# Patient Record
Sex: MALE | Race: OTHER
[De-identification: names, ages, dates, MRNs, and addresses within clinical notes are randomized per-mention and may not be internally consistent; named-entity substitution may affect disease eponyms.]

---

## 2021-08-04 ENCOUNTER — FORM ENCOUNTER (OUTPATIENT)
Age: 21
End: 2021-08-04

## 2021-08-05 PROBLEM — Z00.00 ENCOUNTER FOR PREVENTIVE HEALTH EXAMINATION: Status: ACTIVE | Noted: 2021-08-05

## 2021-08-12 ENCOUNTER — APPOINTMENT (OUTPATIENT)
Dept: NEUROLOGY | Facility: CLINIC | Age: 21
End: 2021-08-12
Payer: COMMERCIAL

## 2021-08-12 PROCEDURE — 95816 EEG AWAKE AND DROWSY: CPT

## 2022-06-13 DIAGNOSIS — D58.2 OTHER HEMOGLOBINOPATHIES: ICD-10-CM

## 2022-06-13 DIAGNOSIS — Z23 ENCOUNTER FOR IMMUNIZATION: ICD-10-CM

## 2022-06-13 RX ORDER — CIPROFLOXACIN HYDROCHLORIDE 500 MG/1
500 TABLET, FILM COATED ORAL
Refills: 0 | Status: ACTIVE | COMMUNITY

## 2022-06-13 RX ORDER — FLUTICASONE PROPIONATE 50 MCG
50 SPRAY, SUSPENSION NASAL
Refills: 0 | Status: ACTIVE | COMMUNITY

## 2022-06-13 RX ORDER — ALBUTEROL SULFATE 90 UG/1
108 (90 BASE) AEROSOL, METERED RESPIRATORY (INHALATION)
Refills: 0 | Status: ACTIVE | COMMUNITY

## 2022-06-16 ENCOUNTER — APPOINTMENT (OUTPATIENT)
Dept: HEMATOLOGY ONCOLOGY | Facility: CLINIC | Age: 22
End: 2022-06-16

## 2022-07-25 ENCOUNTER — RESULT REVIEW (OUTPATIENT)
Age: 22
End: 2022-07-25

## 2022-07-25 ENCOUNTER — TRANSCRIPTION ENCOUNTER (OUTPATIENT)
Age: 22
End: 2022-07-25

## 2022-07-25 ENCOUNTER — APPOINTMENT (OUTPATIENT)
Dept: HEMATOLOGY ONCOLOGY | Facility: CLINIC | Age: 22
End: 2022-07-25

## 2022-07-25 ENCOUNTER — LABORATORY RESULT (OUTPATIENT)
Age: 22
End: 2022-07-25

## 2022-07-25 VITALS
HEIGHT: 73 IN | WEIGHT: 156 LBS | RESPIRATION RATE: 18 BRPM | HEART RATE: 88 BPM | TEMPERATURE: 98.7 F | OXYGEN SATURATION: 99 % | SYSTOLIC BLOOD PRESSURE: 129 MMHG | BODY MASS INDEX: 20.67 KG/M2 | DIASTOLIC BLOOD PRESSURE: 83 MMHG

## 2022-07-25 DIAGNOSIS — Z78.9 OTHER SPECIFIED HEALTH STATUS: ICD-10-CM

## 2022-07-25 DIAGNOSIS — Z87.09 PERSONAL HISTORY OF OTHER DISEASES OF THE RESPIRATORY SYSTEM: ICD-10-CM

## 2022-07-25 DIAGNOSIS — Z87.898 PERSONAL HISTORY OF OTHER SPECIFIED CONDITIONS: ICD-10-CM

## 2022-07-25 PROCEDURE — 99205 OFFICE O/P NEW HI 60 MIN: CPT

## 2022-07-25 NOTE — REVIEW OF SYSTEMS
[Negative] : Allergic/Immunologic [FreeTextEntry6] : Asthma [de-identified] : History of syncopal episode

## 2022-07-25 NOTE — ASSESSMENT
[FreeTextEntry1] : This appears to be a relatively acute and progressive finding.\par I will obtain a CBC with differential and a peripheral blood smear review.\par I will send for a JAK2 with reflex mutational analysis, CALR and MPL mutational analyses.\par I will obtain a complete hematological workup to include a CMP, LDH, haptoglobin, reticulocyte count, B12/MMA/folic acid levels, TSH, SPEP, SIEP, IgQ,serum free light chains, TIERRA,  RF, panel, ferritin level, and a Vikas' test.\par He has been asked to start baby aspirin until the results of the above mentioned work-up for a myeloproliferative process are available.\par The patient will return in 3 weeks for followup, review of the above workup, and further recommendations.\par \par Thank you very much for allowing me to participate in the care of this patient. Should you have any questions or concerns please do not hesitate to call me directly.

## 2022-07-25 NOTE — HISTORY OF PRESENT ILLNESS
[0 - No Distress] : Distress Level: 0 [de-identified] : Mr. Laboy is a 22-year-old male who is referred by Dr. Vivienne Hager for initial consultation for erythrocytosis.\par He was found on routine lab work on 4/15/2022 to have a hemoglobin of 18.2 hematocrit of 54.0.  The remainder of his lab work was unremarkable.  Repeat CBC done on May 5/19/2022 demonstrated a hemoglobin of 17.5 and hematocrit of 54.3 Iron profile done that day was normal.  He has no current complaints but his history reveals episodes of syncope in summer 2021.  EEG was normal.

## 2022-07-26 ENCOUNTER — NON-APPOINTMENT (OUTPATIENT)
Age: 22
End: 2022-07-26

## 2022-07-29 ENCOUNTER — APPOINTMENT (OUTPATIENT)
Dept: UROLOGY | Facility: CLINIC | Age: 22
End: 2022-07-29
Payer: COMMERCIAL

## 2022-07-29 VITALS
HEART RATE: 106 BPM | HEIGHT: 73 IN | SYSTOLIC BLOOD PRESSURE: 135 MMHG | DIASTOLIC BLOOD PRESSURE: 83 MMHG | BODY MASS INDEX: 20.67 KG/M2 | WEIGHT: 156 LBS

## 2022-07-29 PROCEDURE — 99203 OFFICE O/P NEW LOW 30 MIN: CPT

## 2022-07-29 RX ORDER — CIPROFLOXACIN HYDROCHLORIDE 500 MG/1
500 TABLET, FILM COATED ORAL
Qty: 10 | Refills: 0 | Status: ACTIVE | COMMUNITY
Start: 2022-07-29 | End: 1900-01-01

## 2022-07-29 NOTE — HISTORY OF PRESENT ILLNESS
[FreeTextEntry1] : 23yo male presenting with bilateral testicular discomfort. Pain started about 1 week ago. Was 8/10, now about 3/10. Currently on 5 day course of Cipro ordered by PCP. He had similar episode in May and was diagnosed with epididymitis also treated with Cipro for 10 days. He denies any laterality. Reports mild swelling. No US performed. He had a UA 7/25 which was normal. Urine dipstick today is normal. [3] : 3 [Dull] : dull [Sudden] : sudden [___ Week(s) Ago] : [unfilled] week(s) ago [Constant] : constantly [Mild] : mild [Improving] : improving [None] : No relieving factors are noted [de-identified] : testicles

## 2022-07-29 NOTE — PHYSICAL EXAM
[General Appearance - Well Developed] : well developed [General Appearance - Well Nourished] : well nourished [Normal Appearance] : normal appearance [Well Groomed] : well groomed [General Appearance - In No Acute Distress] : no acute distress [Abdomen Soft] : soft [Abdomen Tenderness] : non-tender [Abdomen Hernia] : no hernia was discovered [Costovertebral Angle Tenderness] : no ~M costovertebral angle tenderness [Scrotum] : the scrotum was normal [Epididymis] : the epididymides were normal [Testes Tenderness] : no tenderness of the testes [Testes Mass (___cm)] : there were no testicular masses [Normal Station and Gait] : the gait and station were normal for the patient's age [] : no rash [No Focal Deficits] : no focal deficits [Oriented To Time, Place, And Person] : oriented to person, place, and time [Affect] : the affect was normal [Mood] : the mood was normal [Not Anxious] : not anxious

## 2022-07-29 NOTE — ASSESSMENT
[FreeTextEntry1] : 21yo male with reported history of epididymitis now with similar symptoms\par Symptoms improving on Cipro course\par UA negative\par Repeat UA, culture\par Check scrotal US\par Exam is unremarkable \par Discussed pelvic muscle spasm as possible differential \par Recommend NSAIDs prn\par Warm baths prn \par \par F/u 3 months

## 2022-07-31 LAB
ALBUMIN MFR SERPL ELPH: 63.3 %
ALBUMIN SERPL-MCNC: 5.6 G/DL
ALBUMIN/GLOB SERPL: 1.7 RATIO
ALPHA1 GLOB MFR SERPL ELPH: 3 %
ALPHA1 GLOB SERPL ELPH-MCNC: 0.3 G/DL
ALPHA2 GLOB MFR SERPL ELPH: 7.8 %
ALPHA2 GLOB SERPL ELPH-MCNC: 0.7 G/DL
ANA SER IF-ACNC: NEGATIVE
B-GLOBULIN MFR SERPL ELPH: 10.7 %
B-GLOBULIN SERPL ELPH-MCNC: 1 G/DL
DEPRECATED KAPPA LC FREE/LAMBDA SER: 1.6 RATIO
DEPRECATED KAPPA LC FREE/LAMBDA SER: 1.6 RATIO
EPO SERPL-MCNC: 4.5 MIU/ML
ERYTHROCYTE [SEDIMENTATION RATE] IN BLOOD BY WESTERGREN METHOD: < 2 MM/HR
FERRITIN SERPL-MCNC: 104 NG/ML
FOLATE SERPL-MCNC: 8.9 NG/ML
GAMMA GLOB FLD ELPH-MCNC: 1.4 G/DL
GAMMA GLOB MFR SERPL ELPH: 15.2 %
GENE XXX MUT ANL BLD/T: NORMAL
HAPTOGLOB SERPL-MCNC: 52 MG/DL
IGA SER QL IEP: 234 MG/DL
IGG SER QL IEP: 1177 MG/DL
IGM SER QL IEP: 128 MG/DL
INTERPRETATION SERPL IEP-IMP: NORMAL
IRON SATN MFR SERPL: 34 %
IRON SERPL-MCNC: 111 UG/DL
KAPPA LC CSF-MCNC: 1 MG/DL
KAPPA LC CSF-MCNC: 1 MG/DL
KAPPA LC SERPL-MCNC: 1.6 MG/DL
KAPPA LC SERPL-MCNC: 1.6 MG/DL
LDH SERPL-CCNC: 194 U/L
M PROTEIN SPEC IFE-MCNC: NORMAL
METHYLMALONATE SERPL-SCNC: 90 NMOL/L
MPL EXON 10 MUTATION: NORMAL
PROT SERPL-MCNC: 8.9 G/DL
PROT SERPL-MCNC: 8.9 G/DL
RBC # BLD: 6.15 M/UL
RETICS # AUTO: 1.1 %
RETICS AGGREG/RBC NFR: 70.1 K/UL
TIBC SERPL-MCNC: 328 UG/DL
TSH SERPL-ACNC: 2.59 UIU/ML
UIBC SERPL-MCNC: 216 UG/DL
VIT B12 SERPL-MCNC: 560 PG/ML

## 2022-08-01 LAB
APPEARANCE: CLEAR
BACTERIA UR CULT: NORMAL
BACTERIA: NEGATIVE
BILIRUBIN URINE: NEGATIVE
BLOOD URINE: NEGATIVE
COLOR: COLORLESS
GLUCOSE QUALITATIVE U: NEGATIVE
HYALINE CASTS: 0 /LPF
KETONES URINE: NEGATIVE
LEUKOCYTE ESTERASE URINE: NEGATIVE
MICROSCOPIC-UA: NORMAL
NITRITE URINE: NEGATIVE
PH URINE: 6.5
PROTEIN URINE: NEGATIVE
RED BLOOD CELLS URINE: 0 /HPF
SPECIFIC GRAVITY URINE: 1
SQUAMOUS EPITHELIAL CELLS: 0 /HPF
UROBILINOGEN URINE: NORMAL
WHITE BLOOD CELLS URINE: 0 /HPF

## 2022-08-03 ENCOUNTER — NON-APPOINTMENT (OUTPATIENT)
Age: 22
End: 2022-08-03

## 2022-08-07 ENCOUNTER — RESULT REVIEW (OUTPATIENT)
Age: 22
End: 2022-08-07

## 2022-08-09 ENCOUNTER — NON-APPOINTMENT (OUTPATIENT)
Age: 22
End: 2022-08-09

## 2022-08-15 ENCOUNTER — APPOINTMENT (OUTPATIENT)
Dept: HEMATOLOGY ONCOLOGY | Facility: CLINIC | Age: 22
End: 2022-08-15

## 2022-08-15 LAB
EXTRACTION: NORMAL
JAK2 P.V617F BLD/T QL: NORMAL
LAB DIRECTOR NAME PROVIDER: NORMAL
LABORATORY COMMENT REPORT: NORMAL
REFLEX:: NORMAL

## 2022-08-16 ENCOUNTER — RESULT REVIEW (OUTPATIENT)
Age: 22
End: 2022-08-16

## 2022-08-16 ENCOUNTER — APPOINTMENT (OUTPATIENT)
Dept: HEMATOLOGY ONCOLOGY | Facility: CLINIC | Age: 22
End: 2022-08-16

## 2022-08-16 VITALS
HEART RATE: 95 BPM | DIASTOLIC BLOOD PRESSURE: 71 MMHG | SYSTOLIC BLOOD PRESSURE: 136 MMHG | BODY MASS INDEX: 20.67 KG/M2 | RESPIRATION RATE: 18 BRPM | HEIGHT: 73 IN | WEIGHT: 156 LBS | TEMPERATURE: 98.4 F | OXYGEN SATURATION: 100 %

## 2022-08-16 PROCEDURE — 99214 OFFICE O/P EST MOD 30 MIN: CPT

## 2022-08-16 NOTE — HISTORY OF PRESENT ILLNESS
[de-identified] : Mr. Laboy is a 22-year-old male who is referred by Dr. Vivienne Hager for initial consultation for erythrocytosis.\par He was found on routine lab work on 4/15/2022 to have a hemoglobin of 18.2 hematocrit of 54.0.  The remainder of his lab work was unremarkable.  Repeat CBC done on May 5/19/2022 demonstrated a hemoglobin of 17.5 and hematocrit of 54.3 Iron profile done that day was normal.  He has no current complaints but his history reveals episodes of syncope in summer 2021.  EEG was normal. [de-identified] : Offers no new complaints at time this office evaluation.  He is here to review the results of the hematological work-up from the initial office consultation.

## 2022-08-16 NOTE — ASSESSMENT
[FreeTextEntry1] : This appears to be a relatively acute and progressive finding.\par I obtained a CBC with differential and a peripheral blood smear review.\par I sent for a JAK2 with reflex mutational analysis, CALR and MPL mutational analyses.\par I obtained a complete hematological workup that included a CMP, LDH, haptoglobin, reticulocyte count, B12/MMA/folic acid levels, TSH, SPEP, SIEP, IgQ,serum free light chains, TIERRA,  RF, panel, ferritin level, and a Vikas' test.  This was mostly unremarkable and his molecular studies were negative.  Of note, his total bilirubin was 2.1 with a indirect fraction of 1.8 mg/dL.  At this time, his hemolytic indices do not appear to be significant enough to suggest a hemolytic process.  I will obtain new blood work to include a CBC, retake count, haptoglobin, LDH, Vikas test, CMP with fractionation of bilirubin, and a GGT level.  I have also asked that he follow-up with gastroenterology and he requested assistance with establishing with a new local gastroenterologist.  He is being assisted with this by my office.\par He has been asked to start baby aspirin until the results of the above mentioned work-up for a myeloproliferative process are available.\par The patient will return in 3 weeks for followup, review of the above workup, and further recommendations.\par

## 2022-09-02 DIAGNOSIS — D75.1 SECONDARY POLYCYTHEMIA: ICD-10-CM

## 2022-09-02 DIAGNOSIS — N50.811 RIGHT TESTICULAR PAIN: ICD-10-CM

## 2022-09-02 DIAGNOSIS — N50.812 RIGHT TESTICULAR PAIN: ICD-10-CM

## 2022-09-02 DIAGNOSIS — R17 UNSPECIFIED JAUNDICE: ICD-10-CM

## 2022-09-03 LAB
GGT SERPL-CCNC: 17 U/L
HAPTOGLOB SERPL-MCNC: 52 MG/DL
LDH SERPL-CCNC: 153 U/L

## 2022-09-07 ENCOUNTER — APPOINTMENT (OUTPATIENT)
Dept: HEMATOLOGY ONCOLOGY | Facility: CLINIC | Age: 22
End: 2022-09-07

## 2023-01-01 ENCOUNTER — NON-APPOINTMENT (OUTPATIENT)
Age: 23
End: 2023-01-01

## 2023-06-20 ENCOUNTER — NON-APPOINTMENT (OUTPATIENT)
Age: 23
End: 2023-06-20

## 2025-06-30 ENCOUNTER — NON-APPOINTMENT (OUTPATIENT)
Age: 25
End: 2025-06-30